# Patient Record
Sex: MALE | Race: WHITE | HISPANIC OR LATINO | Employment: FULL TIME | URBAN - METROPOLITAN AREA
[De-identification: names, ages, dates, MRNs, and addresses within clinical notes are randomized per-mention and may not be internally consistent; named-entity substitution may affect disease eponyms.]

---

## 2023-06-17 ENCOUNTER — HOSPITAL ENCOUNTER (EMERGENCY)
Facility: HOSPITAL | Age: 23
Discharge: HOME | End: 2023-06-17
Attending: EMERGENCY MEDICINE

## 2023-06-17 VITALS
HEART RATE: 75 BPM | OXYGEN SATURATION: 100 % | DIASTOLIC BLOOD PRESSURE: 60 MMHG | WEIGHT: 135 LBS | HEIGHT: 68 IN | SYSTOLIC BLOOD PRESSURE: 110 MMHG | RESPIRATION RATE: 16 BRPM | TEMPERATURE: 98 F | BODY MASS INDEX: 20.46 KG/M2

## 2023-06-17 DIAGNOSIS — M77.9 TENDONITIS: Primary | ICD-10-CM

## 2023-06-17 DIAGNOSIS — M25.529 ELBOW PAIN: ICD-10-CM

## 2023-06-17 DIAGNOSIS — M79.603 ARM PAIN: ICD-10-CM

## 2023-06-17 PROCEDURE — 99284 EMERGENCY DEPT VISIT MOD MDM: CPT

## 2023-06-17 PROCEDURE — 63600175 PHARM REV CODE 636 W HCPCS: Performed by: PHYSICIAN ASSISTANT

## 2023-06-17 PROCEDURE — 96372 THER/PROPH/DIAG INJ SC/IM: CPT | Performed by: PHYSICIAN ASSISTANT

## 2023-06-17 RX ORDER — IBUPROFEN 600 MG/1
600 TABLET ORAL EVERY 6 HOURS PRN
Qty: 20 TABLET | Refills: 0 | Status: SHIPPED | OUTPATIENT
Start: 2023-06-17 | End: 2023-06-22

## 2023-06-17 RX ORDER — LIDOCAINE 50 MG/G
1 PATCH TOPICAL DAILY
Qty: 15 PATCH | Refills: 0 | Status: SHIPPED | OUTPATIENT
Start: 2023-06-17 | End: 2023-07-02

## 2023-06-17 RX ORDER — KETOROLAC TROMETHAMINE 30 MG/ML
30 INJECTION, SOLUTION INTRAMUSCULAR; INTRAVENOUS
Status: COMPLETED | OUTPATIENT
Start: 2023-06-17 | End: 2023-06-17

## 2023-06-17 RX ORDER — ACETAMINOPHEN 500 MG
500 TABLET ORAL EVERY 4 HOURS PRN
Qty: 20 TABLET | Refills: 0 | Status: SHIPPED | OUTPATIENT
Start: 2023-06-17 | End: 2023-06-22

## 2023-06-17 RX ORDER — CYCLOBENZAPRINE HCL 10 MG
10 TABLET ORAL 3 TIMES DAILY PRN
Qty: 20 TABLET | Refills: 0 | Status: SHIPPED | OUTPATIENT
Start: 2023-06-17 | End: 2023-06-24

## 2023-06-17 RX ADMIN — KETOROLAC TROMETHAMINE 30 MG: 30 INJECTION, SOLUTION INTRAMUSCULAR; INTRAVENOUS at 04:06
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
## 2023-06-17 NOTE — DISCHARGE INSTRUCTIONS

## 2023-06-17 NOTE — Clinical Note
"Marquis Connell" Ez Garcia was seen and treated in our emergency department on 6/17/2023.  He may return to work on 06/20/2023.       If you have any questions or concerns, please don't hesitate to call.      Christianne Mackey PA-C"

## 2023-06-27 ENCOUNTER — HOSPITAL ENCOUNTER (EMERGENCY)
Facility: HOSPITAL | Age: 23
Discharge: HOME | End: 2023-06-27
Attending: STUDENT IN AN ORGANIZED HEALTH CARE EDUCATION/TRAINING PROGRAM

## 2023-06-27 VITALS
WEIGHT: 135 LBS | TEMPERATURE: 99 F | HEART RATE: 91 BPM | OXYGEN SATURATION: 97 % | RESPIRATION RATE: 20 BRPM | SYSTOLIC BLOOD PRESSURE: 159 MMHG | DIASTOLIC BLOOD PRESSURE: 72 MMHG | BODY MASS INDEX: 21.19 KG/M2 | HEIGHT: 67 IN

## 2023-06-27 DIAGNOSIS — U07.1 COVID-19: Primary | ICD-10-CM

## 2023-06-27 LAB
CTP QC/QA: YES
MOLECULAR STREP A: NEGATIVE
POC MOLECULAR INFLUENZA A AGN: NEGATIVE
POC MOLECULAR INFLUENZA B AGN: NEGATIVE
SARS-COV-2 RDRP RESP QL NAA+PROBE: POSITIVE

## 2023-06-27 PROCEDURE — 87635 SARS-COV-2 COVID-19 AMP PRB: CPT | Performed by: EMERGENCY MEDICINE

## 2023-06-27 PROCEDURE — 87502 INFLUENZA DNA AMP PROBE: CPT

## 2023-06-27 PROCEDURE — 87651 STREP A DNA AMP PROBE: CPT

## 2023-06-27 PROCEDURE — 99284 EMERGENCY DEPT VISIT MOD MDM: CPT

## 2023-06-27 PROCEDURE — 25000003 PHARM REV CODE 250

## 2023-06-27 RX ORDER — CETIRIZINE HYDROCHLORIDE 10 MG/1
10 TABLET ORAL DAILY PRN
Qty: 30 TABLET | Refills: 0 | OUTPATIENT
Start: 2023-06-27 | End: 2023-10-25

## 2023-06-27 RX ORDER — ACETAMINOPHEN 500 MG
500 TABLET ORAL EVERY 6 HOURS PRN
Qty: 20 TABLET | Refills: 0 | Status: SHIPPED | OUTPATIENT
Start: 2023-06-27

## 2023-06-27 RX ORDER — IBUPROFEN 600 MG/1
600 TABLET ORAL EVERY 6 HOURS PRN
Qty: 20 TABLET | Refills: 0 | Status: SHIPPED | OUTPATIENT
Start: 2023-06-27

## 2023-06-27 RX ORDER — IBUPROFEN 600 MG/1
600 TABLET ORAL
Status: COMPLETED | OUTPATIENT
Start: 2023-06-27 | End: 2023-06-27

## 2023-06-27 RX ORDER — PROMETHAZINE HYDROCHLORIDE AND DEXTROMETHORPHAN HYDROBROMIDE 6.25; 15 MG/5ML; MG/5ML
5 SYRUP ORAL EVERY 4 HOURS PRN
Qty: 118 ML | Refills: 0 | Status: SHIPPED | OUTPATIENT
Start: 2023-06-27

## 2023-06-27 RX ORDER — FLUTICASONE PROPIONATE 50 MCG
1 SPRAY, SUSPENSION (ML) NASAL 2 TIMES DAILY PRN
Qty: 15 G | Refills: 0 | OUTPATIENT
Start: 2023-06-27 | End: 2023-09-26

## 2023-06-27 RX ADMIN — IBUPROFEN 600 MG: 600 TABLET, FILM COATED ORAL at 01:06

## 2023-06-27 NOTE — DISCHARGE INSTRUCTIONS
Karthikeyan por venir a nuestro Departamento de Emergencias hoy. Es importante recordar que algunos problemas o condiciones médicas son difíciles de diagnosticar y es posible que no se encuentren o aborden dusty gray visita al Departamento de Emergencias. Estas condiciones a menudo comienzan con síntomas inespecíficos y solo se pueden diagnosticar en visitas de seguimiento con gray médico de atención primaria o especialista cuando los síntomas continúan o cambian. Recuerde que todas las condiciones médicas pueden cambiar y no podemos predecir cómo se sentirá mañana o al día siguiente. Regrese a la elaine de emergencias si tiene preguntas/inquietudes, síntomas nuevos/preocupantes, empeoramiento o falta de mejora.    Asegúrese de hacer un seguimiento con gray médico de atención primaria y revisar con ellos todos los laboratorios/imágenes/pruebas que se realizaron dusty gray visita a la elaine de emergencias. Es muy común que identifiquemos hallazgos incidentales no emergentes que deben ser objeto de seguimiento con gray médico de atención primaria. Algunos laboratorios/imágenes/pruebas pueden estar fuera del rango normal y requieren un seguimiento que no sea de emergencia y/o más investigación/tratamiento/procedimientos/pruebas para ayudar a diagnosticar/excluir/prevenir complicaciones u otras afecciones médicas potencialmente graves. Algunas anormalidades pueden no king sido discutidas o abordadas dusty gray visita a la elaine de emergencias. Es posible que algunos resultados de laboratorio no regresen dusty gray visita a la elaine de emergencias, edy se puede acceder a ellos descargando la aplicación gratuita [facility] o visitando [email or web address] Es importante que revise con gray médico todas las pruebas de laboratorio/imágenes/pruebas que estén fuera del rango normal.    Amberly visita a la elaine de emergencias no reemplaza amberly visita de atención primaria, y muchas pruebas de detección o de seguimiento no pueden ser  ordenadas por un médico de emergencia ni realizadas por la elaine de emergencias. Algunas pruebas pueden incluso requerir aprobación previa.    Si no tiene un médico de atención primaria, puede comunicarse con el que figura en la documentación del indra o también puede llamar al mostrador de citas de la [facility] al [phone] o a [address] [place] [address] al [phone] para programar amberly sammy. sammy, o establecer atención con un médico de atención primaria o incluso un especialista y para obtener información sobre los recursos locales. Es importante para gray georgie que tenga un médico de atención primaria.    [place] todos los medicamentos según las indicaciones. Hemos hecho todo lo posible para seleccionar un medicamento para usted que tratará gray condición; sin embargo, todos los medicamentos pueden tener efectos secundarios y es imposible predecir qué medicamentos pueden causarle efectos secundarios o cuáles (si los hay) esos medicamentos le pueden marcela. Si siente que está teniendo un efecto negativo o un efecto secundario de algún medicamento, debe dejar de erik esos medicamentos de inmediato y buscar atención médica. Si siente que está teniendo amberly reacción potencialmente mortal, llame al 911.    No conduzca, nade, suba a [place], se bañe, opere maquinaria pesada, ananya alcohol o tome medicamentos potencialmente sedantes, firme ningún documento legal o tome decisiones importantes dusty 24 horas si ha recibido algún medicamento para el dolor, sedantes o alteradores del estado de ánimo. medicamentos dusty gray visita a la elaine de emergencias o dentro de las 24 horas de haberlos tomado si se los mcgarry recetado.    Puede encontrar recursos adicionales para dentistas, audífonos, equipos médicos duraderos, farmacias de bajo costo y otros recursos en [email or web address]

## 2023-06-27 NOTE — ED PROVIDER NOTES
Encounter Date: 6/27/2023       History     Chief Complaint   Patient presents with    Sore Throat     PT REPORTS SORE THROAT STARTING Saturday. GENERALIZED BODY ACHES STARTING TODAY. NO MEDICINES TAKEN     22-year-old male with no past medical history presents to the ED for sore throat.  This started on Sunday.  He is not taken anything to make this better.  Patient denies any sick contacts.  Patient admits to a sore throat, hoarse voice, body aches, dry cough, intermittent headache.  Patient denies fever, sweats, chills, congestion, runny nose, trouble swallowing, shortness breath, chest pain, abdominal pain, nausea, vomiting, diarrhea constipation, urinary symptoms, rashes, dizziness, and lightheadedness.    Language line  # [id number] used    Review of patient's allergies indicates:  No Known Allergies  History reviewed. No pertinent past medical history.  History reviewed. No pertinent surgical history.  History reviewed. No pertinent family history.  Social History     Tobacco Use    Smoking status: Never    Smokeless tobacco: Never   Substance Use Topics    Alcohol use: Never    Drug use: Never     Review of Systems   Constitutional:  Negative for chills, diaphoresis and fever.   HENT:  Positive for sore throat and voice change. Negative for congestion, rhinorrhea and trouble swallowing.    Respiratory:  Positive for cough. Negative for shortness of breath.    Cardiovascular:  Negative for chest pain.   Gastrointestinal:  Negative for abdominal pain, constipation, diarrhea, nausea and vomiting.   Genitourinary:  Negative for decreased urine volume, difficulty urinating, dysuria, frequency, hematuria and urgency.   Musculoskeletal:  Positive for myalgias (Body aches).   Skin:  Negative for rash.   Neurological:  Positive for headaches (Intermittent, not currently). Negative for dizziness, weakness and light-headedness.     Physical Exam     Initial Vitals [06/27/23 1220]   BP Pulse Resp Temp SpO2   (!)  159/72 91 20 99.3 °F (37.4 °C) 97 %      MAP       --         Physical Exam    Nursing note and vitals reviewed.  Constitutional: He appears well-developed and well-nourished. He is not diaphoretic. He is active. He does not appear ill. No distress.   HENT:   Head: Normocephalic and atraumatic.   Right Ear: External ear normal.   Left Ear: External ear normal.   Nose: Nose normal.   Mouth/Throat: Uvula is midline, oropharynx is clear and moist and mucous membranes are normal.   Eyes: Conjunctivae, EOM and lids are normal. Pupils are equal, round, and reactive to light. Right eye exhibits no discharge. Left eye exhibits no discharge. No scleral icterus.   Neck:   Normal range of motion.  Cardiovascular:  Normal rate and regular rhythm.           Pulmonary/Chest: Effort normal and breath sounds normal. No respiratory distress.   Abdominal: He exhibits no distension.   Musculoskeletal:         General: Normal range of motion.      Cervical back: Normal range of motion.     Neurological: He is alert and oriented to person, place, and time. He has normal strength. No sensory deficit. GCS eye subscore is 4. GCS verbal subscore is 5. GCS motor subscore is 6.   Skin: Skin is dry. Capillary refill takes less than 2 seconds.       ED Course   Procedures  Labs Reviewed   SARS-COV-2 RDRP GENE - Abnormal; Notable for the following components:       Result Value    POC Rapid COVID Positive (*)     All other components within normal limits   POCT INFLUENZA A/B MOLECULAR   POCT STREP A MOLECULAR          Imaging Results    None          Medications   ibuprofen tablet 600 mg (600 mg Oral Given 6/27/23 1322)     Medical Decision Making:   Initial Assessment:   22-year-old male with no past medical history presents to the ED for sore throat.  Patient's chart and medical history reviewed.  Differential Diagnosis:   COVID  Flu  Strep throat  Viral URI  Clinical Tests:   Lab Tests: Reviewed and Ordered  ED Management:  Patient's vitals  reviewed.  He is afebrile, no respiratory distress, nontoxic-appearing in the ED. patient's physical exam was unremarkable.  Patient given Motrin for his body aches.  Patient is flu and strep negative. Patient is COVID positive. Patient's O2 sat is 97% on room air with no respiratory distress and bilateral normal breath sounds. Discussed with patient he will need to quarantine for the next 3 days or until he is afebrile for 24 hours without taking any medications that would lower his temperature or any new symptoms developing.  Discussed with patient to rest and stay well hydrated.  Patient will be sent home with Flonase, Zyrtec, promethazine DM cough syrup, benzocaine throat lozenges, Motrin, and Tylenol for symptomatic control.  Patient follow-up with his PCP. Patient agrees with this plan. Discussed with him strict return precautions, he verbalized understanding. Patient is stable for discharge.                           Clinical Impression:   Final diagnoses:  [U07.1] COVID-19 (Primary)        ED Disposition Condition    Discharge Stable          ED Prescriptions       Medication Sig Dispense Start Date End Date Auth. Provider    ibuprofen (ADVIL,MOTRIN) 600 MG tablet Take 1 tablet (600 mg total) by mouth every 6 (six) hours as needed for Pain or Temperature greater than. 20 tablet 6/27/2023 -- Alayna Holdsworth, PA-C    acetaminophen (TYLENOL) 500 MG tablet Take 1 tablet (500 mg total) by mouth every 6 (six) hours as needed for Pain or Temperature greater than. 20 tablet 6/27/2023 -- Alayna Holdsworth, PA-C    fluticasone propionate (FLONASE) 50 mcg/actuation nasal spray 1 spray (50 mcg total) by Each Nostril route 2 (two) times daily as needed for Rhinitis or Allergies. 15 g 6/27/2023 -- Alayna Holdsworth, PA-C    cetirizine (ZYRTEC) 10 MG tablet Take 1 tablet (10 mg total) by mouth daily as needed for Allergies or Rhinitis. 30 tablet 6/27/2023 -- Alayna Holdsworth, PA-C    promethazine-dextromethorphan  (PROMETHAZINE-DM) 6.25-15 mg/5 mL Syrp Take 5 mLs by mouth every 4 (four) hours as needed (cough/congestion). 118 mL 6/27/2023 -- Alayna Holdsworth, PA-C    benzocaine-menthoL 6-10 mg lozenge Take 1 lozenge by mouth every 2 (two) hours as needed for Pain (sore throat). 18 tablet 6/27/2023 -- Alayna Holdsworth, PA-C          Follow-up Information       Follow up With Specialties Details Why Contact Info    [facility] [place]  If symptoms worsen [address] [facility] [address] [phone]             Alayna Holdsworth, PA-C  06/27/23 0468

## 2023-06-27 NOTE — Clinical Note
"Marquis Connell" Ez Garcia was seen and treated in our emergency department on 6/27/2023.  He may return to work on 07/01/2023.  Patient tested positive for COVID on 06/27/2023. He may return to work after 5 days of quarantine as long as he is fever free for 24 hours without taking medications that would lower his temperature or any new symptoms arising.       If you have any questions or concerns, please don't hesitate to call.      Alayna Holdsworth, PA-C"

## 2023-09-26 ENCOUNTER — HOSPITAL ENCOUNTER (EMERGENCY)
Facility: HOSPITAL | Age: 23
Discharge: HOME | End: 2023-09-26
Attending: EMERGENCY MEDICINE

## 2023-09-26 VITALS
DIASTOLIC BLOOD PRESSURE: 75 MMHG | BODY MASS INDEX: 20.31 KG/M2 | HEART RATE: 67 BPM | HEIGHT: 68 IN | WEIGHT: 134 LBS | OXYGEN SATURATION: 99 % | TEMPERATURE: 99 F | SYSTOLIC BLOOD PRESSURE: 133 MMHG | RESPIRATION RATE: 15 BRPM

## 2023-09-26 DIAGNOSIS — J06.9 VIRAL URI: Primary | ICD-10-CM

## 2023-09-26 LAB
CTP QC/QA: YES
MOLECULAR STREP A: NEGATIVE
POC MOLECULAR INFLUENZA A AGN: NEGATIVE
POC MOLECULAR INFLUENZA B AGN: NEGATIVE
SARS-COV-2 RDRP RESP QL NAA+PROBE: NEGATIVE

## 2023-09-26 PROCEDURE — 87502 INFLUENZA DNA AMP PROBE: CPT

## 2023-09-26 PROCEDURE — 99284 EMERGENCY DEPT VISIT MOD MDM: CPT

## 2023-09-26 PROCEDURE — 25000003 PHARM REV CODE 250: Performed by: NURSE PRACTITIONER

## 2023-09-26 PROCEDURE — 87635 SARS-COV-2 COVID-19 AMP PRB: CPT | Performed by: PHYSICIAN ASSISTANT

## 2023-09-26 PROCEDURE — 87651 STREP A DNA AMP PROBE: CPT

## 2023-09-26 RX ORDER — IBUPROFEN 600 MG/1
600 TABLET ORAL
Status: COMPLETED | OUTPATIENT
Start: 2023-09-26 | End: 2023-09-26

## 2023-09-26 RX ORDER — FLUTICASONE PROPIONATE 50 MCG
1 SPRAY, SUSPENSION (ML) NASAL 2 TIMES DAILY PRN
Qty: 15 G | Refills: 0 | OUTPATIENT
Start: 2023-09-26 | End: 2023-10-25

## 2023-09-26 RX ORDER — MONTELUKAST SODIUM 10 MG/1
10 TABLET ORAL NIGHTLY
Qty: 30 TABLET | Refills: 0 | Status: SHIPPED | OUTPATIENT
Start: 2023-09-26 | End: 2023-10-26

## 2023-09-26 RX ADMIN — IBUPROFEN 600 MG: 600 TABLET ORAL at 02:09

## 2023-09-26 NOTE — ED PROVIDER NOTES
"Source of History:  Patient, chart    Chief complaint:  Sore Throat (Pt to ED with complaints of sore throat and "a little bit of the flu." States symptoms began yesterday. Denies cough. Denies medical hx. Denies taking medications PTA. )      HPI:  Marquis Garcia is a 22 y.o. male with no PMH presenting to the ED with sore throat that started last night. Pt denies fever, chills, body aches, congestion, or ear pain. Patient denies any sick contacts. Pt denies taking anything for pain. Pt rates his throat pain 1/10 and request something for pain.     This is the extent to the patients complaints today here in the emergency department.    ROS: As per HPI and below:  Constitutional: Negative for fever  Eyes: No discharge  ENT: Negative for nasal congestion. Erythema to pharynx. Negative for exudate. Negative for ear pain.   Respiratory: No difficulty breathing. Negative for cough.   Abdomen: No abdominal pain.   Genito-Urinary: No abnormal urination.  Neurologic: No weakness. Negative for headache.   MSK: no injuries  Integument: No rashes or lesions.  Hematologic: No easy bruising.  Endocrine: No excessive thirst or urination.    Review of patient's allergies indicates:  No Known Allergies    PMH:  As per HPI and below:  History reviewed. No pertinent past medical history.  History reviewed. No pertinent surgical history.    Social History     Tobacco Use    Smoking status: Never    Smokeless tobacco: Never   Substance Use Topics    Alcohol use: Never    Drug use: Never       Physical Exam:    /75 (BP Location: Right arm, Patient Position: Sitting)   Pulse 67   Temp 98.6 °F (37 °C) (Oral)   Resp 15   Ht 5' 8" (1.727 m)   Wt 60.8 kg (134 lb)   SpO2 99%   BMI 20.37 kg/m²   Nursing note and vital signs reviewed.  Constitutional: No acute distress. Sickly appearing. Afebrile.   Eyes: No conjunctival injection.  Moist eyes with good tear production.  Extraocular muscles are intact.  ENT: Mild " erythema to pharynx. Moist mucous membranes. Tonsils 2+ with no exudate, not kissing, no asymmetry, uvula midline. Negative for ear pain. No palpable cervical lymph nodes present.   Cardiovascular: Regular rate and rhythm. No murmurs, gallops or rubs.  Respiratory: Clear to auscultation bilaterally.  Good air movement.  No wheezes.  No rhonchi. No rales. No accessory muscle use. No cough. No SOB.   Abdomen: Soft.  Not distended.  Nontender.  No guarding.  No rebound. Non-peritoneal.  Musculoskeletal: Good range of motion all joints.  No deformities.  Neck supple.  No meningismus.  Skin: No rashes seen.  Good turgor.  No abrasions.  No ecchymoses.  Neurologic: Motor intact and moving all extremities.  No focal neurological deficits  Mental Status:  Alert.  Appropriate for age.    MDM    Emergent evaluation of a 21 yo male presenting for sore throat.  On exam pt is A&Ox3. VSS. Nonfebrile and nontoxic appearing. Upon assessment with , pt states his sore throat started last night. Pt denies any fevers, cough, body aches, ear pain, congestion, or being around any sick contacts. Mild erythema noted to pharynx. No exudates. No palpable cervical lymph nodes present. Mucous membranes pink and moist. Tonsils with no redness, erythema or exudates. Breath sounds clear bilaterally.  Abdomen soft and nontender. No rebound or guarding appreciated on exam.   BS WNL.  Pt speaking in full sentences.  Steady gait appreciated. Cap refill < 3 seconds.      History Acquisition   Additional history was not acquired from other historians.  Chart    The patient's list of active medical problems, social history, medications, and allergies as documented per RN staff has been reviewed.     Differential Diagnoses   Based on available information and the initial assessment, the working differential diagnoses considered during this evaluation include but are not limited to COVID, Influenza, Group A strep, viral pharyngitis,  allergic rhinitis, bacterial pharyngitis, Rhinovirus, or Mononucleosis.     I will get covid, flu, strep, medicate and reassess      LABS     Labs Reviewed   SARS-COV-2 RDRP GENE   POCT INFLUENZA A/B MOLECULAR   POCT STREP A MOLECULAR     Additional Consideration   All available testing was considered during the course of this workup.  Comorbidities taken into consideration during the patient's evaluation and treatment include weight, age.    Social determinants of health were taken into consideration during development of our treatment plan.    Medications   ibuprofen tablet 600 mg (600 mg Oral Given 9/26/23 1443)         Patient's COVID, influenza and strep all negative.  Patient reassessed.  Patient advised symptoms likely due to postnasal drip and allergic rhinitis.  Will discharge home with medications to help with allergies.  Advised warm tea or cool broth may help with pain.  Advised follow-up with PCP as needed.  Strict return to ED precautions discussed.  Patient verbalized understanding of this plan of care.  All questions and concerns addressed    Patient is hemodynamically stable, vital signs are normal. Discharge instructions given. Return to ED precautions discussed. Follow up as directed. Pt verbalized understanding of this plan.  Pt is stable for discharge.     CLINICAL IMPRESSION  1. Viral URI         ED Disposition Condition    Discharge Stable            Instruction:  I see no indication of an emergent process beyond that addressed during our encounter but have duly counseled the patient/family regarding the need for prompt follow-up as well as the indications that should prompt immediate return to the emergency room should new or worrisome developments occur.  The patient/family has been provided with verbal and printed direction regarding our final diagnosis(es) as well as instructions regarding use of OTC and/or Rx medications intended to manage the patient's aforementioned conditions  including:  ED Prescriptions       Medication Sig Dispense Start Date End Date Auth. Provider    montelukast (SINGULAIR) 10 mg tablet Take 1 tablet (10 mg total) by mouth every evening. 30 tablet 9/26/2023 10/26/2023 Mai Jefferson NP    fluticasone propionate (FLONASE) 50 mcg/actuation nasal spray 1 spray (50 mcg total) by Each Nostril route 2 (two) times daily as needed for Rhinitis. 15 g 9/26/2023 10/26/2023 Mai Jefferson, BRENDAN          Patient has been advised of following recommended follow-up instructions:  Follow-up Information       Follow up With Specialties Details Why Contact Info    PCP  Schedule an appointment as soon as possible for a visit  As needed           The patient/family communicates understanding of all this information and all remaining questions and concerns were addressed at this time.      The patient's condition did not warrant review of the  and prescription of controlled substances.      This note was created using dictation software.  This program may occasionally mistype words and phrases.         Mai Jefferson NP  09/26/23 8292

## 2023-09-26 NOTE — DISCHARGE INSTRUCTIONS
You were seen and evaluated in the ER today.  Your COVID, influenza and strep were all negative.  We are going to treat your symptoms.  You can rotate Tylenol ibuprofen as needed for pain.  Please follow-up with your PCP as needed.  Please return to the ED for any worsening symptoms such as chest pain, shortness of breath, fever not controlled with Tylenol or ibuprofen or uncontrolled pain.      Our goal in the emergency department is to always give you outstanding care and exceptional service. You may receive a survey by mail or e-mail in the next week regarding your experience in our ED. We would greatly appreciate your completing and returning the survey. Your feedback provides us with a way to recognize our staff who give very good care and it helps us learn how to improve when your experience was below our aspiration of excellence.

## 2023-09-26 NOTE — Clinical Note
"Marquis TERRY" Ez Garcia was seen and treated in our emergency department on 9/26/2023.  He may return to work on 09/28/2023.       If you have any questions or concerns, please don't hesitate to call.       LPN    "

## 2023-09-26 NOTE — ED PROVIDER NOTES
Triage Note:     CC:  Sore Throat    HPI:  This is a 22 year old male who presents to the ED complaining of throat pain and flu like symptoms.    PE: Limited due to triage.    Orders were placed while patient is awaiting room.  Will have another provider re-evaluate when a room is available.       Gila Schwab PA-C  09/26/23 1580

## 2023-10-25 ENCOUNTER — HOSPITAL ENCOUNTER (EMERGENCY)
Facility: HOSPITAL | Age: 23
Discharge: HOME | End: 2023-10-25
Attending: EMERGENCY MEDICINE

## 2023-10-25 VITALS
HEIGHT: 67 IN | WEIGHT: 135 LBS | BODY MASS INDEX: 21.19 KG/M2 | SYSTOLIC BLOOD PRESSURE: 124 MMHG | RESPIRATION RATE: 18 BRPM | DIASTOLIC BLOOD PRESSURE: 80 MMHG | HEART RATE: 65 BPM | TEMPERATURE: 98 F | OXYGEN SATURATION: 99 %

## 2023-10-25 DIAGNOSIS — R04.0 EPISTAXIS: Primary | ICD-10-CM

## 2023-10-25 PROCEDURE — 25000003 PHARM REV CODE 250: Performed by: PHYSICIAN ASSISTANT

## 2023-10-25 PROCEDURE — 99282 EMERGENCY DEPT VISIT SF MDM: CPT

## 2023-10-25 RX ORDER — OXYMETAZOLINE HCL 0.05 %
1 SPRAY, NON-AEROSOL (ML) NASAL
Status: COMPLETED | OUTPATIENT
Start: 2023-10-25 | End: 2023-10-25

## 2023-10-25 RX ADMIN — NASAL DECONGESTANT 1 SPRAY: 0.05 SPRAY NASAL at 06:10

## 2023-10-25 NOTE — ED TRIAGE NOTES
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 1 episode today. No bleeding at present.   Pt denies any other issues.   Pt AAOX4

## 2023-10-25 NOTE — Clinical Note
"Marquis TERRY" Ez Garcia was seen and treated in our emergency department on 10/25/2023.  He may return to work on 10/26/2023.       If you have any questions or concerns, please don't hesitate to call.       RN    "

## 2023-10-26 NOTE — ED PROVIDER NOTES
"Encounter Date: 10/25/2023    SCRIBE #1 NOTE: I, Razia Watson, am scribing for, and in the presence of,  Saskia Craig PA-C. I have scribed the following portions of the note - Other sections scribed: HPI, ROS.       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 1 episode today. No bleeding at present      22 y/o male with no PMHx, who presents to the ED for evaluation of intermittent epistaxis from the right nare. 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 Patient admits when he went home, he tried to clean the powder out, and symptoms began after. Patient states he was evaluated at a different hospital yesterday, where he was diagnosed and treated for sinusitis. Patient notes symptoms have not resolved with compliance with prescribed meds.     The history is provided by the patient. A  was used (#[id number]).     Review of patient's allergies indicates:  No Known Allergies  No past medical history on file.  No past surgical history on file.  No family history on file.  Social History     Tobacco Use    Smoking status: Never    Smokeless tobacco: Never   Substance Use Topics    Alcohol use: Never    Drug use: Never     Review of Systems   Constitutional:  Negative for fever.   HENT:  Positive for nosebleeds (R nare). Negative for congestion, sore throat and trouble swallowing.    Respiratory:  Negative for cough and shortness of breath.    Cardiovascular:  Negative for chest pain.   Gastrointestinal:  Negative for abdominal pain, constipation, diarrhea, nausea and vomiting.   Genitourinary:  Negative for dysuria, flank pain, frequency and urgency.   Musculoskeletal:  Negative for back pain.   Skin:  Negative for " rash.   Neurological:  Negative for headaches.       Physical Exam     Initial Vitals [10/25/23 1721]   BP Pulse Resp Temp SpO2   124/80 65 18 98.2 °F (36.8 °C) 99 %      MAP       --         Physical Exam    Constitutional: He appears well-developed and well-nourished.   HENT:   Right Ear: External ear normal.   Left Ear: External ear normal.   Dried blood in right naris.  No active bleeding   Eyes: Conjunctivae and EOM are normal. Pupils are equal, round, and reactive to light.   Neck: Neck supple.   Normal range of motion.  Cardiovascular:  Normal rate, regular rhythm, normal heart sounds and intact distal pulses.           Abdominal: Abdomen is soft.   Musculoskeletal:         General: Normal range of motion.      Cervical back: Normal range of motion and neck supple.     Neurological: He is alert and oriented to person, place, and time. He has normal strength.   Skin: Skin is warm.         ED Course   Procedures  Labs Reviewed - No data to display       Imaging Results    None          Medications   oxymetazoline 0.05 % nasal spray 1 spray (1 spray Each Nostril Given 10/25/23 1847)     Medical Decision Making  Risk  OTC drugs.            Scribe Attestation:   Scribe #1: I performed the above scribed service and the documentation accurately describes the services I performed. I attest to the accuracy of the note.            Scribe attestation: I, Saskia Craig PA-C , personally performed the services described in this documentation. All medical record entries made by the scribe were at my direction and in my presence.  I have reviewed the chart and agree that the record reflects my personal performance and is accurate and complete.             Clinical Impression:   Final diagnoses:  [R04.0] Epistaxis (Primary)        ED Disposition Condition    Discharge Stable          ED Prescriptions    None       Follow-up Information       Follow up With Specialties Details Why Contact Info    [facility]ol[facility]  Schedule an appointment as soon as possible for a visit   [facility] [address]  [facility] [address]  [phone]             Saskia Craig, PA  10/25/23 2020

## 2024-02-27 ENCOUNTER — HOSPITAL ENCOUNTER (EMERGENCY)
Facility: HOSPITAL | Age: 24
Discharge: HOME | End: 2024-02-27
Attending: EMERGENCY MEDICINE

## 2024-02-27 VITALS
OXYGEN SATURATION: 100 % | HEART RATE: 75 BPM | DIASTOLIC BLOOD PRESSURE: 60 MMHG | SYSTOLIC BLOOD PRESSURE: 131 MMHG | TEMPERATURE: 98 F | RESPIRATION RATE: 20 BRPM

## 2024-02-27 DIAGNOSIS — R04.0 EPISTAXIS: Primary | ICD-10-CM

## 2024-02-27 PROCEDURE — 99281 EMR DPT VST MAYX REQ PHY/QHP: CPT

## 2024-02-27 NOTE — ED PROVIDER NOTES
"Encounter Date: 2/27/2024       History     Chief Complaint   Patient presents with    Epistaxis     Had a nose bleed for several minutes on yesterday, states last nose bleed he came here & they gave him spray but that nasal spray didn't work this time. No epistaxis currently     23-year-old male with past medical history of nosebleeds presents to the ED today for evaluation of nosebleed.  Patient reports he had a nosebleed yesterday that lasted for approximately 3 minutes before spontaneously subsiding.  He did not take any medication for treatment at that time.  Patient reports in the past when he had a nosebleed he was given oxymetazoline nasal spray, and he was concerned because he states his medication "did not work" because he got another nosebleed yesterday.  His last nosebleed before yesterday was many months ago.  Denies any bleeding coming from his nose today.  Denies any other symptoms at this time including headache, sore throat, cough, fever, congestion.  Denies putting anything in his nose.    The history is provided by the patient. The history is limited by a language barrier. A  was used (#:[id number]).     Review of patient's allergies indicates:  No Known Allergies  History reviewed. No pertinent past medical history.  History reviewed. No pertinent surgical history.  History reviewed. No pertinent family history.  Social History     Tobacco Use    Smoking status: Never    Smokeless tobacco: Never   Substance Use Topics    Alcohol use: Never    Drug use: Never     Review of Systems   Constitutional:  Negative for chills, fatigue and fever.   HENT:  Positive for nosebleeds. Negative for ear pain, facial swelling, sore throat and trouble swallowing.    Eyes:  Negative for visual disturbance.   Respiratory:  Negative for cough and shortness of breath.    Cardiovascular:  Negative for chest pain.   Gastrointestinal:  Negative for nausea and vomiting.   Genitourinary:  Negative for " dysuria.   Musculoskeletal:  Negative for back pain.   Skin:  Negative for rash.   Neurological:  Negative for syncope and weakness.   Hematological:  Does not bruise/bleed easily.       Physical Exam     Initial Vitals [02/27/24 1350]   BP Pulse Resp Temp SpO2   129/63 83 16 98 °F (36.7 °C) 99 %      MAP       --         Physical Exam    Nursing note and vitals reviewed.  Constitutional: He appears well-developed and well-nourished. He is not diaphoretic. No distress.   HENT:   Head: Normocephalic and atraumatic.   Right Ear: External ear normal.   Left Ear: External ear normal.   Nose: No rhinorrhea, sinus tenderness, nasal deformity, septal deviation or nasal septal hematoma. No epistaxis.  No foreign bodies.   Mouth/Throat: Oropharynx is clear and moist and mucous membranes are normal.   Dried blood seen in the right nares.  No active bleeding identified.   Eyes: Conjunctivae are normal.   Neck:   Normal range of motion.  Cardiovascular:  Normal rate.           Pulmonary/Chest: No respiratory distress.   Abdominal: He exhibits no distension.   Musculoskeletal:      Cervical back: Normal range of motion.     Neurological: He is alert and oriented to person, place, and time. GCS score is 15. GCS eye subscore is 4. GCS verbal subscore is 5. GCS motor subscore is 6.   Skin: Skin is warm and dry.   Psychiatric: He has a normal mood and affect. His behavior is normal. Thought content normal.         ED Course   Procedures  Labs Reviewed - No data to display       Imaging Results    None          Medications - No data to display  Medical Decision Making  23-year-old male with past medical history of nosebleeds presents to the ED today for evaluation of nosebleed.  Patient reports he had a nosebleed yesterday that lasted for approximately 3 minutes before spontaneously subsiding.  He did not take any medication for treatment at that time.  Patient reports in the past when he had a nosebleed he was given oxymetazoline  "nasal spray, and he was concerned because he states his medication "did not work" because he got another nosebleed yesterday.  His last nosebleed before yesterday was many months ago.  Denies any bleeding coming from his nose today.  Denies any other symptoms at this time including headache, sore throat, cough, fever, congestion.  Denies putting anything in his nose. On exam there some dried blood in the right nares.  No active bleeding identified.  No septal hematoma.  No nasal foreign body. Please see physical exam section above for remainder of physical exam findings.  Counseled the patient that he should only use oxymetazoline if he was currently experiencing a nosebleed in an attempt to stop bleeding.  He should not use this medication daily.  He should never use this medication for more than 3 days in a row.  We discussed nosebleed prevention strategies including using a humidifier, or nasal saline spray.  We discussed following up with ENT if symptoms persist.  He may return to ED if he is unable to stop nosebleed at home.    Of note: Differential diagnosis to include but not limited to:  Epistaxis, nasal foreign body, upper respiratory infection    Candace Morris PA-C    DISCLAIMER: This note was prepared with [employer] voice recognition transcription software. Garbled syntax, mangled pronouns, and other bizarre constructions may be attributed to that software system. If you have any questions regarding information in this note please contact me.                                             Clinical Impression:  Final diagnoses:  [R04.0] Epistaxis (Primary)          ED Disposition Condition    Discharge Stable          ED Prescriptions    None       Follow-up Information       Follow up With Specialties Details Why Contact Info    Julieth Calero MD Otolaryngology Schedule an appointment as soon as possible for a visit  If symptoms worsen [address]  [place] [address]  [phone]      [facility] [place] - " Emergency Dept Emergency Medicine Go to  As needed [address] [facility] [place] [address] [phone]             Candace Morris PA-C  02/27/24 8273

## 2024-02-27 NOTE — DISCHARGE INSTRUCTIONS
Carlita el material educativo para pacientes sobre estrategias caseras para prevenir hemorragias nasales. No utilice el aerosol nasal de oximetazolina a menos que tenga amberly hemorragia nasal aguda en un intento de detener la hemorragia. Nunca use penny medicamento por más de 3 días seguidos. Rashid un seguimiento con el otorrinolaringólogo que se enumera a continuación si mamie síntomas persisten.

## 2024-02-27 NOTE — ED TRIAGE NOTES
Pt presents to ER with complaints of nose bleeds on yesterday. Pt showed a pic of nose spray that he was given for the nose bleeds he has had in the past. Pt states it is not working this time. Pt denies any other issues at this time.  [id number]

## 2024-02-27 NOTE — Clinical Note
"Marquis TERRY" Ez Garcia was seen and treated in our emergency department on 2/27/2024.  He may return to work on 02/28/2024.       If you have any questions or concerns, please don't hesitate to call.      Candace Morris PA-C"

## 2024-08-03 ENCOUNTER — HOSPITAL ENCOUNTER (EMERGENCY)
Facility: HOSPITAL | Age: 24
Discharge: HOME | End: 2024-08-03
Attending: EMERGENCY MEDICINE

## 2024-08-03 VITALS
RESPIRATION RATE: 18 BRPM | HEIGHT: 67 IN | OXYGEN SATURATION: 99 % | BODY MASS INDEX: 22.6 KG/M2 | TEMPERATURE: 98 F | SYSTOLIC BLOOD PRESSURE: 120 MMHG | HEART RATE: 65 BPM | DIASTOLIC BLOOD PRESSURE: 66 MMHG | WEIGHT: 144 LBS

## 2024-08-03 DIAGNOSIS — R53.1 EPISODE OF GENERALIZED WEAKNESS: Primary | ICD-10-CM

## 2024-08-03 LAB — POCT GLUCOSE: 92 MG/DL (ref 70–110)

## 2024-08-03 PROCEDURE — 82962 GLUCOSE BLOOD TEST: CPT

## 2024-08-03 PROCEDURE — 99282 EMERGENCY DEPT VISIT SF MDM: CPT

## 2024-09-07 ENCOUNTER — HOSPITAL ENCOUNTER (EMERGENCY)
Facility: HOSPITAL | Age: 24
Discharge: HOME | End: 2024-09-08
Attending: STUDENT IN AN ORGANIZED HEALTH CARE EDUCATION/TRAINING PROGRAM

## 2024-09-07 VITALS
WEIGHT: 140 LBS | BODY MASS INDEX: 21.97 KG/M2 | DIASTOLIC BLOOD PRESSURE: 70 MMHG | RESPIRATION RATE: 20 BRPM | HEIGHT: 67 IN | HEART RATE: 68 BPM | SYSTOLIC BLOOD PRESSURE: 113 MMHG | TEMPERATURE: 98 F | OXYGEN SATURATION: 97 %

## 2024-09-07 DIAGNOSIS — R10.13 EPIGASTRIC ABDOMINAL PAIN: Primary | ICD-10-CM

## 2024-09-07 LAB
BILIRUB UR QL STRIP: NEGATIVE
CLARITY UR: CLEAR
COLOR UR: YELLOW
GLUCOSE UR QL STRIP: NEGATIVE
HGB UR QL STRIP: NEGATIVE
KETONES UR QL STRIP: NEGATIVE
LEUKOCYTE ESTERASE UR QL STRIP: NEGATIVE
NITRITE UR QL STRIP: NEGATIVE
PH UR STRIP: 6 [PH] (ref 5–8)
PROT UR QL STRIP: NEGATIVE
SP GR UR STRIP: 1.02 (ref 1–1.03)
URN SPEC COLLECT METH UR: NORMAL
UROBILINOGEN UR STRIP-ACNC: NEGATIVE EU/DL

## 2024-09-07 PROCEDURE — 99282 EMERGENCY DEPT VISIT SF MDM: CPT

## 2024-09-07 PROCEDURE — 81003 URINALYSIS AUTO W/O SCOPE: CPT

## 2024-09-07 NOTE — Clinical Note
"Marquis TERRY" Ez Garcia was seen and treated in our emergency department on 9/7/2024.  He may return to work on 09/08/2024.       If you have any questions or concerns, please don't hesitate to call.      Beto Sahu PA-C"

## 2024-09-08 NOTE — ED PROVIDER NOTES
Encounter Date: 9/7/2024       History     Chief Complaint   Patient presents with    Abdominal Pain     Pt reports abdominal pain x 1 day.  Believes he at something bad.  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     23 y.o. male with history below presents for evaluation of abdominal pain.  Patient currently has no abdominal pain.  He tells me that he ate tacos last night and woke up in the middle of the night for work and had abdominal pain.  He took an unknown medication which improved the pain however his work referred him to the emergency room for evaluation and for a work note.  He tells me currently has no abdominal pain, nausea, vomiting.  No chest pain or shortness breath.  His only request is that I discharge him with a work note.    Triage vitals were reviewed and are: Initial Vitals [09/07/24 2001]  BP: 113/70  Pulse: 68  Resp: 20  Temp: 98.4 °F (36.9 °C)  SpO2: 97 %  MAP: n/a    The Patient:   has no past medical history on file.   has no past surgical history on file.   reports that he has never smoked. He has never used smokeless tobacco. He reports that he does not drink alcohol and does not use drugs.  Has allergies listed as: Patient has no known allergies.        The history is provided by the patient. No  was used.     Review of patient's allergies indicates:  No Known Allergies  No past medical history on file.  No past surgical history on file.  No family history on file.  Social History     Tobacco Use    Smoking status: Never    Smokeless tobacco: Never   Substance Use Topics    Alcohol use: Never    Drug use: Never     Review of Systems   Constitutional:  Negative for chills, fatigue and fever.   HENT:  Negative for congestion, hearing loss, sore throat and trouble swallowing.    Eyes:  Negative for visual disturbance.   Respiratory:  Negative for cough, chest tightness and shortness of breath.    Cardiovascular:  Negative  for chest pain.   Gastrointestinal:  Positive for abdominal pain. Negative for constipation, diarrhea, nausea and vomiting.   Endocrine: Negative for cold intolerance and heat intolerance.   Genitourinary:  Negative for difficulty urinating and frequency.   Musculoskeletal:  Negative for arthralgias and myalgias.   Skin:  Negative for rash.   Neurological:  Negative for dizziness and headaches.   Psychiatric/Behavioral:  Negative for suicidal ideas. The patient is not nervous/anxious.        Physical Exam     Initial Vitals [09/07/24 2001]   BP Pulse Resp Temp SpO2   113/70 68 20 98.4 °F (36.9 °C) 97 %      MAP       --         Physical Exam    Nursing note and vitals reviewed.  Constitutional: He appears well-developed and well-nourished.   Body mass index is 21.93 kg/m².   HENT:   Head: Normocephalic and atraumatic.   Eyes: EOM are normal. Pupils are equal, round, and reactive to light.   Neck: Neck supple.   Cardiovascular:  Normal rate, regular rhythm and intact distal pulses.           Pulmonary/Chest: No respiratory distress.   Abdominal:   Patient placed supine with flexed knees.  Abdomen is soft, nondistended, nontender throughout and without guarding. There are no overlying skin changes. There are normal bowel sounds. Distal Pulses 2+.     Focused Testing //  (-) Vela's sign   (-) Rebound Tenderness   (-) Heel Tap / Bed rock          Musculoskeletal:      Cervical back: Neck supple.     Neurological: He is alert and oriented to person, place, and time. GCS score is 15. GCS eye subscore is 4. GCS verbal subscore is 5. GCS motor subscore is 6.   Skin: Skin is warm and dry. Capillary refill takes less than 2 seconds.   Psychiatric: He has a normal mood and affect. His behavior is normal.         ED Course   Procedures  Labs Reviewed   URINALYSIS, REFLEX TO URINE CULTURE       Result Value    Specimen UA Urine, Clean Catch      Color, UA Yellow      Appearance, UA Clear      pH, UA 6.0      Specific Gravity,  UA 1.025      Protein, UA Negative      Glucose, UA Negative      Ketones, UA Negative      Bilirubin (UA) Negative      Occult Blood UA Negative      Nitrite, UA Negative      Urobilinogen, UA Negative      Leukocytes, UA Negative      Narrative:     Specimen Source->Urine          Imaging Results    None          Medications - No data to display  Medical Decision Making  Encounter Date: 9/7/2024  --------------------------------------------------------------------------  23 y.o. male presents for evaluation of abdominal pain.  Hemodynamically stable. Afebrile. Phonating and protecting the airway spontaneously. No clinical evidence for cardiovascular instability or impending airway compromise.  Remainder of physical exam as above.    Additional or Independent Historians available and contributing to the history as above: NONE  Prior medical records, when available, were reviewed. This includes a review of the patients comorbidities, medications, and recent hospital or outpatient notes.   Comorbid Conditions affecting evaluation, treatment or discharge planning: NONE IDENTIFIED   Social Determinates of Health identified and considered in the evaluation and treatment of this patient: None Identified or significantly impacting evaluation and treatment    Differential diagnoses includes but is not limited to:   AAA, aortic dissection, mesenteric ischemia, perforated viscous, MI/ACS, SBO/volvulus, incarcerated/strangulated hernia, intussusception, ileus, appendicitis, cholecystitis, cholangitis, diverticulitis, esophagitis, hepatitis, nephrolithiasis, pancreatitis, gastroenteritis, colitis, IBD/IBS, biliary colic, GERD, PUD, constipation, UTI/pyelonephritis,  disorder.  --------------------------------------------------------------------------  All available clinical tests were reviewed by me and documented in the ED course.  Vitals range:   Temp:  [98.4 °F (36.9 °C)] 98.4 °F (36.9 °C)  Pulse:  [68] 68  Resp:  [20]  "20  SpO2:  [97 %] 97 %  BP: (113)/(70) 113/70  Estimated body mass index is 21.93 kg/m² as calculated from the following:    Height as of this encounter: 5' 7" (1.702 m).    Weight as of this encounter: 63.5 kg (140 lb).    ED MEDS GIVEN:  Medications - No data to display    Procedures Performed: None  --------------------------------------------------------------------------  Clinical picture today most consistent with epigastric abdominal pain.  Abdominal pain has since resolved.  Abdomen is benign.  He is requesting work note.  Do not suspect acute emergent pathology, no evidence of surgical abdomen.  Doubt alternate pathology as listed in the differential above.    I see no indication of an emergent process beyond that addressed during our encounter but have duly counseled the patient/family regarding the need for prompt follow-up as well as the indications that should prompt immediate return to the emergency room should new or worrisome developments occur.  The patient/family has been provided with verbal and printed direction regarding our final diagnosis(es) as well as instructions regarding use of OTC and/or Rx medications intended to manage the patient's conditions.   The patient/family communicates understanding of all this information and all remaining questions and concerns were addressed at this time.  DISCLAIMER: This note was prepared with [employer] voice recognition transcription software. Garbled syntax, mangled pronouns, and other bizarre constructions may be attributed to that software system.    Final diagnoses:  [R10.13] Epigastric abdominal pain (Primary)                Amount and/or Complexity of Data Reviewed  Labs:  Decision-making details documented in ED Course.               ED Course as of 09/07/24 2146   Sat Sep 07, 2024   2107 BP: 113/70 [AN]   2107 Temp: 98.4 °F (36.9 °C) [AN]   2107 Pulse: 68 [AN]   2107 Resp: 20 [AN]   2107 SpO2: 97 % [AN]   2116 Urinalysis, Reflex to Urine Culture " Urine, Clean Catch  No convincing evidence of urinary tract infection [AN]      ED Course User Index  [AN] Beto Sahu PA-C                           Clinical Impression:  Final diagnoses:  [R10.13] Epigastric abdominal pain (Primary)          ED Disposition Condition    Discharge Stable          ED Prescriptions    None       Follow-up Information       Follow up With Specialties Details Why Contact Info    [facility] Emergency Dept Emergency Medicine Go to  As needed, If symptoms worsen [address] [facility] [place] [id number] [phone]    Primary Care Manager  Schedule an appointment as soon as possible for a visit in 1 week               Beto Sahu PA-C  09/07/24 8790

## 2024-09-08 NOTE — DISCHARGE INSTRUCTIONS
Instrucciones específicas para cada problema: regrese al Departamento de Emergencias si experimenta un dolor que empeora o no se puede controlar, fiebre de 100.4 °F o más, vómitos recurrentes, incapacidad para tolerar alimentos o líquidos por la boca, heces con nell o vómitos, heces negras o alquitranadas o cualquier otro síntoma preocupante.     Si recibió o le dan de indra con analgésicos o relajantes musculares, comprenda que pueden causarle sueño o afectar gray juicio. No tome decisiones importantes, no ananya, no conduzca, no nade ni realice ninguna otra tarea que no haría si estuviera bajo los efectos del alcohol dusty al menos 24 horas después de gray última dosis.      Asegúrese de hacer un seguimiento con gray proveedor de atención primaria o cualquier proveedor adicional que se incluya en esta hoja de indra. Si keisha es posible que esté lo suficientemente saludable jason para irse a casa hoy, no puedo predecir el curso exacto de mamie diagnósticos. Es importante recordar que algunos problemas son difíciles de diagnosticar y es posible que no se detecten dusty gray primera visita. Jason tyrone, es gray responsabilidad controlar los síntomas, hacer un seguimiento con otro proveedor de atención médica o regresar a la elaine de emergencias si surgen problemas nuevos o empeoran. A menos que se indique lo contrario, continúe tomando todos los medicamentos que cuba en gray hogar y cualquier medicamento nuevo que le receten en el Departamento de Emergencias.